# Patient Record
Sex: MALE | Race: WHITE | NOT HISPANIC OR LATINO
[De-identification: names, ages, dates, MRNs, and addresses within clinical notes are randomized per-mention and may not be internally consistent; named-entity substitution may affect disease eponyms.]

---

## 2018-11-23 PROBLEM — Z00.00 ENCOUNTER FOR PREVENTIVE HEALTH EXAMINATION: Status: ACTIVE | Noted: 2018-11-23

## 2022-12-30 ENCOUNTER — APPOINTMENT (OUTPATIENT)
Dept: ORTHOPEDIC SURGERY | Facility: CLINIC | Age: 37
End: 2022-12-30
Payer: COMMERCIAL

## 2022-12-30 ENCOUNTER — TRANSCRIPTION ENCOUNTER (OUTPATIENT)
Age: 37
End: 2022-12-30

## 2022-12-30 VITALS — BODY MASS INDEX: 35.55 KG/M2 | HEIGHT: 69 IN | WEIGHT: 240 LBS

## 2022-12-30 DIAGNOSIS — S43.402A UNSPECIFIED SPRAIN OF LEFT SHOULDER JOINT, INITIAL ENCOUNTER: ICD-10-CM

## 2022-12-30 PROCEDURE — 99203 OFFICE O/P NEW LOW 30 MIN: CPT

## 2022-12-30 NOTE — PHYSICAL EXAM
[NL (45)] : left lateral flexion 45 degrees [NL (80)] : left lateral rotation 80 degrees [Left] : left shoulder [Sitting] : sitting [Moderate] : moderate [] : negative impingement testing [FreeTextEntry8] : no deltoid/pec tenderness.

## 2022-12-30 NOTE — HISTORY OF PRESENT ILLNESS
[Left Arm] : left arm [3] : 3 [1] : 2 [Rest] : rest [Exercising] : exercising [Full time] : Work status: full time [de-identified] : 37 year old RHD male with in the left shoulder, chest region. Symptoms started two weeks ago, no specific injury. He had pain into the left chest and arm, weas seen at [facility] ER, underwent full cardiac workup, including stress test, which was negative. Pain over the past 72 hours have improved. No pain with motion, sleeping, no numbness or tingling. He has taken NSAIDS. Pain was alleviated after taking a gas x. Feeling 85% better.  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

## 2022-12-30 NOTE — DISCUSSION/SUMMARY
[de-identified] : Case discussed, his orthopedic exam at this point is negative and he is feeling 85% better.\par If symptoms persist would see PCP or if symptoms return he will return to the office for evaluation while symptoms are present.

## 2023-01-04 ENCOUNTER — APPOINTMENT (OUTPATIENT)
Dept: GASTROENTEROLOGY | Facility: CLINIC | Age: 38
End: 2023-01-04
Payer: COMMERCIAL

## 2023-01-04 VITALS
HEIGHT: 69 IN | HEART RATE: 103 BPM | SYSTOLIC BLOOD PRESSURE: 130 MMHG | WEIGHT: 239 LBS | DIASTOLIC BLOOD PRESSURE: 84 MMHG | BODY MASS INDEX: 35.4 KG/M2

## 2023-01-04 DIAGNOSIS — Z87.891 PERSONAL HISTORY OF NICOTINE DEPENDENCE: ICD-10-CM

## 2023-01-04 DIAGNOSIS — Z80.0 FAMILY HISTORY OF MALIGNANT NEOPLASM OF DIGESTIVE ORGANS: ICD-10-CM

## 2023-01-04 DIAGNOSIS — E66.9 OBESITY, UNSPECIFIED: ICD-10-CM

## 2023-01-04 PROCEDURE — 99204 OFFICE O/P NEW MOD 45 MIN: CPT

## 2023-01-04 RX ORDER — OMEPRAZOLE MAGNESIUM 20 MG/1
20 TABLET, DELAYED RELEASE ORAL
Refills: 0 | Status: ACTIVE | COMMUNITY

## 2023-01-05 PROBLEM — E66.9 OBESITY (BMI 30-39.9): Status: ACTIVE | Noted: 2023-01-05

## 2023-01-05 PROBLEM — Z80.0 FAMILY HISTORY OF MALIGNANT NEOPLASM OF COLON: Status: ACTIVE | Noted: 2023-01-05

## 2023-01-05 PROBLEM — Z87.891 FORMER SMOKER: Status: ACTIVE | Noted: 2023-01-05

## 2023-01-05 NOTE — ASSESSMENT
[FreeTextEntry1] : 1.  Atypical chest pain, heartburn, improved with PPI.  Cardiac workup negative.  Likely esophagitis/ gastritis secondary to NSAID use.  Differential includes peptic ulcer, hiatus hernia.\par 2.  Shoulder sprain.\par 3.  Obesity.\par 4.  Family history of colon cancer (grandfather, great-uncle, great-aunt).\par \par Recs:\par - Obtain ER records for review.\par - Patient was counseled on GERD lifestyle modifications including head of bed elevation at night, avoiding lying down 2-3 hours after eating, weight loss, avoiding tight-fitting clothing, eating small, frequent meals, and minimizing potential food triggers (tomatoes/sauce, caffeine, alcohol, spicy foods, citrus foods, red meat, chocolate, mint, carbonated beverages).\par - Continue PPI for 1 week.  Can use longer course if symptoms persist.\par - Minimize NSAID use.\par - Continue diet and exercise for weight loss.\par - Perform colonoscopy after age 40 due to family history.  May consider upper endoscopy at that time.

## 2023-01-05 NOTE — HISTORY OF PRESENT ILLNESS
[FreeTextEntry1] : Aristeo presents to the office with his mother for evaluation of atypical chest pain.\par \par The patient reports that he started to have left shoulder pain on December 11 so he started taking aspirin 325 mg.  He went to the [facility] and was ruled out for ACS.  He continued to take 1-2 tablets of aspirin for the next week for his shoulder pain and went back to the [facility] ER one week later when he started to develop substernal chest pain.  In the ER and with his outpatient cardiologist, he underwent multiple tests, including a stress test, and was ruled out for cardiac pain.  He reports that he started to eat healthier due to the cardiac scare.  He then stopped taking aspirin, but started to use Tylenol, ibuprofen or Aleve for his shoulder pain.  His substernal pain subsequently increased.  He would wake up with mild symptoms which would improve after he got up but most of the pain started in the afternoon.  He denies any dysphagia or GI bleeding.  He has lost some weight with his dietary changes.  He started taking Prilosec one week ago and reports that for the past 2 days, his substernal pain has significantly improved.  He also experienced relief after using Gas-X.  He has a family history of colon cancer in multiple second degree relatives (grandfather, great-aunt and great-uncle).

## 2023-01-06 ENCOUNTER — APPOINTMENT (OUTPATIENT)
Dept: ORTHOPEDIC SURGERY | Facility: CLINIC | Age: 38
End: 2023-01-06

## 2023-01-13 ENCOUNTER — NON-APPOINTMENT (OUTPATIENT)
Age: 38
End: 2023-01-13

## 2023-02-22 ENCOUNTER — TRANSCRIPTION ENCOUNTER (OUTPATIENT)
Age: 38
End: 2023-02-22

## 2023-02-23 ENCOUNTER — TRANSCRIPTION ENCOUNTER (OUTPATIENT)
Age: 38
End: 2023-02-23

## 2023-03-28 ENCOUNTER — APPOINTMENT (OUTPATIENT)
Dept: GASTROENTEROLOGY | Facility: CLINIC | Age: 38
End: 2023-03-28
Payer: COMMERCIAL

## 2023-03-28 ENCOUNTER — LABORATORY RESULT (OUTPATIENT)
Age: 38
End: 2023-03-28

## 2023-03-28 DIAGNOSIS — R07.89 OTHER CHEST PAIN: ICD-10-CM

## 2023-03-28 PROCEDURE — 99212 OFFICE O/P EST SF 10 MIN: CPT | Mod: 25

## 2023-03-28 PROCEDURE — 43239 EGD BIOPSY SINGLE/MULTIPLE: CPT

## 2023-03-28 NOTE — ASSESSMENT
[FreeTextEntry1] : 1.  Atypical chest pain, heartburn, improved with PPI.  Cardiac workup negative.  Likely esophagitis/ gastritis secondary to NSAID use.  Differential includes peptic ulcer, hiatus hernia.\par 2.  Shoulder sprain.\par 3.  Obesity.\par 4.  Family history of colon cancer (grandfather, great-uncle, great-aunt).\par \par Recs:\par -Patient medically optimized.  Proceed to EGD.\par - Perform colonoscopy after age 40 due to family history.

## 2023-03-28 NOTE — HISTORY OF PRESENT ILLNESS
[FreeTextEntry1] : Aristeo presents to the office today to undergo an EGD for evaluation of atypical chest pain and heartburn.  He was last seen in the office in January 2023.\par \par Since his list visit, the patient has relief of his symptoms when he takes a 2 week course of Prilosec but once he stops, his symptoms have recurred.  He also tried alternate day dosing but felt best when he was taking it daily.  He has even taken it twice a day at times.  He has tried to be careful with his diet.\par \par PMH:  The patient was seen in January 2023 for atypical chest pain.  In December 2022, he was having left shoulder pain so he started taking aspirin 325 mg and went to [facility] where he was ruled out for ACS.  He continued to take 1-2 tablets of aspirin for the next week for his shoulder pain and went back to the [facility] ER one week later when he started to develop substernal chest pain.  In the ER and with his outpatient cardiologist, he underwent multiple tests, including a stress test, and was ruled out for cardiac pain.  He then stopped taking aspirin, but started to use Tylenol, ibuprofen or Aleve for his shoulder pain.  His substernal pain subsequently increased.  He was experiencing relief with Prilosec and Gas-X.  He has a family history of colon cancer in multiple second degree relatives (grandfather, great-aunt and great-uncle).

## 2023-03-28 NOTE — PHYSICAL EXAM
[Alert] : alert [Sclera] : the sclera and conjunctiva were normal [Hearing Threshold Finger Rub Not [place]] : hearing was normal [No Respiratory Distress] : no respiratory distress [Auscultation Breath Sounds / Voice Sounds] : lungs were clear to auscultation bilaterally [Heart Rate And Rhythm] : heart rate was normal and rhythm regular [Normal S1, S2] : normal S1 and S2 [Bowel Sounds] : normal bowel sounds [Abdomen Soft] : soft [Normal Color / Pigmentation] : normal skin color and pigmentation [No Focal Deficits] : no focal deficits [Oriented To Time, Place, And Person] : oriented to person, place, and time

## 2023-04-04 ENCOUNTER — NON-APPOINTMENT (OUTPATIENT)
Age: 38
End: 2023-04-04